# Patient Record
Sex: FEMALE | Race: WHITE
[De-identification: names, ages, dates, MRNs, and addresses within clinical notes are randomized per-mention and may not be internally consistent; named-entity substitution may affect disease eponyms.]

---

## 2017-01-30 ENCOUNTER — HOSPITAL ENCOUNTER (OUTPATIENT)
Dept: HOSPITAL 62 - OD | Age: 37
End: 2017-01-30
Attending: SURGERY
Payer: COMMERCIAL

## 2017-01-30 DIAGNOSIS — E46: Primary | ICD-10-CM

## 2017-01-30 DIAGNOSIS — Z98.84: ICD-10-CM

## 2017-01-30 DIAGNOSIS — K90.9: ICD-10-CM

## 2017-01-30 LAB
ALBUMIN SERPL-MCNC: 3.5 G/DL (ref 3.5–5)
ALP SERPL-CCNC: 110 U/L (ref 38–126)
ALT SERPL-CCNC: 20 U/L (ref 9–52)
ANION GAP SERPL CALC-SCNC: 10 MMOL/L (ref 5–19)
AST SERPL-CCNC: 17 U/L (ref 14–36)
BILIRUB DIRECT SERPL-MCNC: 0 MG/DL (ref 0–0.3)
BILIRUB SERPL-MCNC: 1 MG/DL (ref 0.2–1.3)
BUN SERPL-MCNC: 9 MG/DL (ref 7–20)
CALCIUM: 9.2 MG/DL (ref 8.4–10.2)
CHLORIDE SERPL-SCNC: 101 MMOL/L (ref 98–107)
CO2 SERPL-SCNC: 29 MMOL/L (ref 22–30)
CREAT SERPL-MCNC: 0.71 MG/DL (ref 0.52–1.25)
ERYTHROCYTE [DISTWIDTH] IN BLOOD BY AUTOMATED COUNT: 12.4 % (ref 11.5–14)
FERRITIN SERPL-MCNC: 23.6 NG/ML (ref 6.2–137)
FOLATE SERPL-MCNC: > 20 NG/ML (ref 2.76–?)
GLUCOSE SERPL-MCNC: 81 MG/DL (ref 75–110)
HCT VFR BLD CALC: 38.8 % (ref 36–47)
HGB BLD-MCNC: 12.9 G/DL (ref 12–15.5)
HGB HCT DIFFERENCE: -0.1
IRON SERPL-MCNC: 72 UG/DL (ref 37–170)
MCH RBC QN AUTO: 30.7 PG (ref 27–33.4)
MCHC RBC AUTO-ENTMCNC: 33.1 G/DL (ref 32–36)
MCV RBC AUTO: 93 FL (ref 80–97)
POTASSIUM SERPL-SCNC: 4.2 MMOL/L (ref 3.6–5)
PROT SERPL-MCNC: 6.7 G/DL (ref 6.3–8.2)
RBC # BLD AUTO: 4.19 10^6/UL (ref 3.72–5.28)
SODIUM SERPL-SCNC: 139.9 MMOL/L (ref 137–145)
VIT B12 SERPL-MCNC: 805 PG/ML (ref 239–931)
WBC # BLD AUTO: 7.7 10^3/UL (ref 4–10.5)

## 2017-01-30 PROCEDURE — 80076 HEPATIC FUNCTION PANEL: CPT

## 2017-01-30 PROCEDURE — 80048 BASIC METABOLIC PNL TOTAL CA: CPT

## 2017-01-30 PROCEDURE — 84425 ASSAY OF VITAMIN B-1: CPT

## 2017-01-30 PROCEDURE — 82746 ASSAY OF FOLIC ACID SERUM: CPT

## 2017-01-30 PROCEDURE — 83970 ASSAY OF PARATHORMONE: CPT

## 2017-01-30 PROCEDURE — 82306 VITAMIN D 25 HYDROXY: CPT

## 2017-01-30 PROCEDURE — 85027 COMPLETE CBC AUTOMATED: CPT

## 2017-01-30 PROCEDURE — 82607 VITAMIN B-12: CPT

## 2017-01-30 PROCEDURE — 36415 COLL VENOUS BLD VENIPUNCTURE: CPT

## 2017-01-30 PROCEDURE — 82728 ASSAY OF FERRITIN: CPT

## 2017-01-30 PROCEDURE — 84207 ASSAY OF VITAMIN B-6: CPT

## 2017-01-30 PROCEDURE — 83540 ASSAY OF IRON: CPT

## 2017-01-31 LAB
25(OH)D3 SERPL-MCNC: 30.6 NG/ML (ref 30–100)
PTH-INTACT SERPL-MCNC: 29 PG/ML (ref 15–65)

## 2017-07-21 ENCOUNTER — HOSPITAL ENCOUNTER (OUTPATIENT)
Dept: HOSPITAL 62 - OD | Age: 37
End: 2017-07-21
Attending: SURGERY
Payer: COMMERCIAL

## 2017-07-21 DIAGNOSIS — E46: Primary | ICD-10-CM

## 2017-07-21 DIAGNOSIS — Z98.84: ICD-10-CM

## 2017-07-21 LAB
ALBUMIN SERPL-MCNC: 3.8 G/DL (ref 3.5–5)
ALP SERPL-CCNC: 110 U/L (ref 38–126)
ALT SERPL-CCNC: 19 U/L (ref 9–52)
ANION GAP SERPL CALC-SCNC: 8 MMOL/L (ref 5–19)
AST SERPL-CCNC: 16 U/L (ref 14–36)
BILIRUB DIRECT SERPL-MCNC: 0.3 MG/DL (ref 0–0.4)
BILIRUB SERPL-MCNC: 0.9 MG/DL (ref 0.2–1.3)
BUN SERPL-MCNC: 8 MG/DL (ref 7–20)
CALCIUM: 8.9 MG/DL (ref 8.4–10.2)
CHLORIDE SERPL-SCNC: 103 MMOL/L (ref 98–107)
CO2 SERPL-SCNC: 28 MMOL/L (ref 22–30)
CREAT SERPL-MCNC: 0.64 MG/DL (ref 0.52–1.25)
ERYTHROCYTE [DISTWIDTH] IN BLOOD BY AUTOMATED COUNT: 12.8 % (ref 11.5–14)
FERRITIN SERPL-MCNC: 22.8 NG/ML (ref 6.2–137)
FOLATE SERPL-MCNC: 11.3 NG/ML (ref 2.76–?)
GLUCOSE SERPL-MCNC: 86 MG/DL (ref 75–110)
HCT VFR BLD CALC: 39.8 % (ref 36–47)
HGB BLD-MCNC: 13.1 G/DL (ref 12–15.5)
HGB HCT DIFFERENCE: -0.5
IRON SERPL-MCNC: 97.3 UG/DL (ref 37–170)
MCH RBC QN AUTO: 30.1 PG (ref 27–33.4)
MCHC RBC AUTO-ENTMCNC: 32.8 G/DL (ref 32–36)
MCV RBC AUTO: 92 FL (ref 80–97)
POTASSIUM SERPL-SCNC: 4.6 MMOL/L (ref 3.6–5)
PROT SERPL-MCNC: 6.8 G/DL (ref 6.3–8.2)
RBC # BLD AUTO: 4.34 10^6/UL (ref 3.72–5.28)
SODIUM SERPL-SCNC: 139.3 MMOL/L (ref 137–145)
VIT B12 SERPL-MCNC: 855 PG/ML (ref 239–931)
WBC # BLD AUTO: 7 10^3/UL (ref 4–10.5)

## 2017-07-21 PROCEDURE — 82728 ASSAY OF FERRITIN: CPT

## 2017-07-21 PROCEDURE — 36415 COLL VENOUS BLD VENIPUNCTURE: CPT

## 2017-07-21 PROCEDURE — 82306 VITAMIN D 25 HYDROXY: CPT

## 2017-07-21 PROCEDURE — 82746 ASSAY OF FOLIC ACID SERUM: CPT

## 2017-07-21 PROCEDURE — 82607 VITAMIN B-12: CPT

## 2017-07-21 PROCEDURE — 84207 ASSAY OF VITAMIN B-6: CPT

## 2017-07-21 PROCEDURE — 80076 HEPATIC FUNCTION PANEL: CPT

## 2017-07-21 PROCEDURE — 85027 COMPLETE CBC AUTOMATED: CPT

## 2017-07-21 PROCEDURE — 83970 ASSAY OF PARATHORMONE: CPT

## 2017-07-21 PROCEDURE — 80048 BASIC METABOLIC PNL TOTAL CA: CPT

## 2017-07-21 PROCEDURE — 84425 ASSAY OF VITAMIN B-1: CPT

## 2017-07-21 PROCEDURE — 83540 ASSAY OF IRON: CPT

## 2017-07-22 LAB
25(OH)D3 SERPL-MCNC: 33.4 NG/ML (ref 30–100)
PTH-INTACT SERPL-MCNC: 32 PG/ML (ref 15–65)

## 2019-11-01 LAB
APPEARANCE UR: (no result)
APTT PPP: YELLOW S
BILIRUB UR QL STRIP: NEGATIVE
ERYTHROCYTE [DISTWIDTH] IN BLOOD BY AUTOMATED COUNT: 14 % (ref 11.5–14)
GLUCOSE UR STRIP-MCNC: NEGATIVE MG/DL
HCT VFR BLD CALC: 36.6 % (ref 36–47)
HGB BLD-MCNC: 12.1 G/DL (ref 12–15.5)
KETONES UR STRIP-MCNC: NEGATIVE MG/DL
MCH RBC QN AUTO: 28.8 PG (ref 27–33.4)
MCHC RBC AUTO-ENTMCNC: 33.2 G/DL (ref 32–36)
MCV RBC AUTO: 87 FL (ref 80–97)
NITRITE UR QL STRIP: NEGATIVE
PH UR STRIP: 6 [PH] (ref 5–9)
PLATELET # BLD: 249 10^3/UL (ref 150–450)
PROT UR STRIP-MCNC: NEGATIVE MG/DL
RBC # BLD AUTO: 4.22 10^6/UL (ref 3.72–5.28)
SP GR UR STRIP: 1.02
UROBILINOGEN UR-MCNC: 4 MG/DL (ref ?–2)
WBC # BLD AUTO: 6.8 10^3/UL (ref 4–10.5)

## 2019-11-06 ENCOUNTER — HOSPITAL ENCOUNTER (OUTPATIENT)
Dept: HOSPITAL 62 - OROUT | Age: 39
Discharge: HOME | End: 2019-11-06
Attending: OBSTETRICS & GYNECOLOGY
Payer: COMMERCIAL

## 2019-11-06 VITALS — SYSTOLIC BLOOD PRESSURE: 128 MMHG | DIASTOLIC BLOOD PRESSURE: 50 MMHG

## 2019-11-06 DIAGNOSIS — N93.9: ICD-10-CM

## 2019-11-06 DIAGNOSIS — N84.0: Primary | ICD-10-CM

## 2019-11-06 DIAGNOSIS — N71.0: ICD-10-CM

## 2019-11-06 PROCEDURE — 58558 HYSTEROSCOPY BIOPSY: CPT

## 2019-11-06 PROCEDURE — 81001 URINALYSIS AUTO W/SCOPE: CPT

## 2019-11-06 PROCEDURE — 85027 COMPLETE CBC AUTOMATED: CPT

## 2019-11-06 PROCEDURE — 36415 COLL VENOUS BLD VENIPUNCTURE: CPT

## 2019-11-06 PROCEDURE — 88305 TISSUE EXAM BY PATHOLOGIST: CPT

## 2019-11-06 PROCEDURE — 81025 URINE PREGNANCY TEST: CPT

## 2019-11-06 NOTE — BRIEF OPERATIVE NOTE
BRIEF OPERATIVE REPORT


DATE OF SURGERY: 11/06/19


TIME OF SURGERY: 13:40


PREOPERATIVE DIAGNOSIS: Abnormal uterine bleeding.  Abnormal Ultrasound finding 

in endometrium


POSTOPERATIVE DIAGNOSIS: Same as above and Endometrial polyp


SURGEON: VERONICA FLORES ASSISTANT: PIETER SARMIENTO


FINDINGS: Endometrial polyp left endometrial cavity.  Fluffy endometrium 

throughout


COMPLICATIONS: 





None


ESTIMATED BLOOD LOSS: 10 ml


TISSUE REMOVED OR ALTERED: Endometrial polyp and endometrial tissue


TECHNICAL PROCEDURE: See operative report for details

## 2019-11-06 NOTE — OPERATIVE REPORT
Operative Report


DATE OF SURGERY: 11/06/19


Operative Report: 





Dilation of cervix, hysteroscopy and Myosure with removal of endometrial polyp


PREOPERATIVE DIAGNOSIS: Abnormal uterine bleeding.  Abnormal US of uterus


POSTOPERATIVE DIAGNOSIS: Same as above with addition of endometrial polyp


OPERATION: Dilation of cervix, hysteroscopy and Myosure removal of endometrial 

polyp


SURGEON: VERONICA DAVID


1ST ASSISTANT: PIETER SARMIENTO


ANESTHESIA: Moderate Sedation


TISSUE REMOVED OR ALTERED: Endometrial polyp and endometrial tissue


COMPLICATIONS: 





None


ESTIMATED BLOOD LOSS: 10 cc


INTRAOPERATIVE FINDINGS: Endometrial polyp left endometrial cavity.  Fluffy 

endometrium throughout


PROCEDURE: 





The patient was taken to the operating room where monitored anesthesia was 

administered and found to be adequate. She was then placed in the dorsal 

lithotomy position and preppped and draped in the usual fashion. A time out was 

taken. A weighted speculum was placed in the posterior vaginal vault and a mathew 

retractor was used anteriorly to bring the cervix into good view . THe anterior 

lip of the cervix was grasped with a single tooth tenaculum and the cervix was 

serial dilated to allow passage of a #5 hysteroscope. The uterus was sounded to 

8 cm. The hysteroscope was then inserted and using a saline distension medium 

the cavity was inspected . A Polyp was noted in the left endometrial cavity. It 

filled the cavity on the left. The remaining endometrium was fluffy throughout. 

The ostia were identified. The Myosure was then advanced and the polyp removed. 

The remained cavity was cleared of any debris. Good results were noted. Pictures

were obtained pre and post procedure. All instruments were removed from patients

vagina. A small amount of oozing was noted on the right anterior lip of the 

cervix. Pressure was held and silver nitrate applied. Hemostasis was obtained. 


All instrument, sponge and needle counts were correct x2. The patient tolerated 

the procedure well. She was allowed to awake from anesthesia and transfered to 

recover room in stable condition.